# Patient Record
Sex: MALE | Race: ASIAN | NOT HISPANIC OR LATINO | URBAN - METROPOLITAN AREA
[De-identification: names, ages, dates, MRNs, and addresses within clinical notes are randomized per-mention and may not be internally consistent; named-entity substitution may affect disease eponyms.]

---

## 2023-04-08 ENCOUNTER — EMERGENCY (EMERGENCY)
Facility: HOSPITAL | Age: 33
LOS: 1 days | Discharge: HOME | End: 2023-04-08
Admitting: STUDENT IN AN ORGANIZED HEALTH CARE EDUCATION/TRAINING PROGRAM
Payer: COMMERCIAL

## 2023-04-08 VITALS
HEART RATE: 80 BPM | WEIGHT: 175.05 LBS | HEIGHT: 69 IN | DIASTOLIC BLOOD PRESSURE: 85 MMHG | RESPIRATION RATE: 16 BRPM | TEMPERATURE: 98 F | OXYGEN SATURATION: 94 % | SYSTOLIC BLOOD PRESSURE: 131 MMHG

## 2023-04-08 VITALS
HEART RATE: 87 BPM | SYSTOLIC BLOOD PRESSURE: 154 MMHG | DIASTOLIC BLOOD PRESSURE: 92 MMHG | TEMPERATURE: 98 F | OXYGEN SATURATION: 95 % | RESPIRATION RATE: 16 BRPM

## 2023-04-08 PROCEDURE — 87491 CHLMYD TRACH DNA AMP PROBE: CPT

## 2023-04-08 PROCEDURE — 99284 EMERGENCY DEPT VISIT MOD MDM: CPT

## 2023-04-08 PROCEDURE — 87591 N.GONORRHOEAE DNA AMP PROB: CPT

## 2023-04-08 PROCEDURE — 99282 EMERGENCY DEPT VISIT SF MDM: CPT

## 2023-04-08 NOTE — ED PROVIDER NOTE - PATIENT PORTAL LINK FT
You can access the FollowMyHealth Patient Portal offered by [facility] by registering at the following website: [email or web address]. By joining [facility]’s FollowMyHealth portal, you will also be able to view your health information using other applications (apps) compatible with our system.

## 2023-04-08 NOTE — ED ADULT NURSE NOTE - VOIDING
"Chief Complaint   Patient presents with     Hypertension       Initial /82  Pulse 64  Temp 97.8  F (36.6  C) (Oral)  Wt 160 lb 3.2 oz (72.7 kg)  SpO2 100%  BMI 25.09 kg/m2 Estimated body mass index is 25.09 kg/(m^2) as calculated from the following:    Height as of 7/13/17: 5' 7\" (1.702 m).    Weight as of this encounter: 160 lb 3.2 oz (72.7 kg).  Medication Reconciliation: complete   Neva MADDEN M.A.      "
without difficulty

## 2023-04-08 NOTE — ED PROVIDER NOTE - GENITOURINARY, MLM
No penile discharge,  small 4 x 5 mm bloody blister on the lateral aspect of the penile shaft, no active bleeding, no testicular mass, no tenderness,

## 2023-04-08 NOTE — ED PROVIDER NOTE - NSFOLLOWUPCLINICS_GEN_ALL_ED_FT
[facility]  Urology  [address], [address]  [place], [place] [address]  Phone: [phone]  Fax:

## 2023-04-08 NOTE — ED ADULT NURSE NOTE - OBJECTIVE STATEMENT
Pt presented to the ED with complaints of penile pain. As per pt, during sexual intercourse he started having penile pain and noticed there was a blister on his penis. Pt denies dysuria, hematuria, fevers, or abdominal pain.

## 2023-04-08 NOTE — ED PROVIDER NOTE - CLINICAL SUMMARY MEDICAL DECISION MAKING FREE TEXT BOX
33 yo male in the ER with small blood filled blister on his penis noted after intercourse tonight. No penile discharge or lesions, no ulcers, no rash, no testicular pain or mass. most likely small hematoma from friction. unlikely infection.

## 2023-04-08 NOTE — ED PROVIDER NOTE - CARE PROVIDER_API CALL
Renny Watt)  Urology  [address], [address]  [place], [place] [address]  Phone: [phone]  Fax: [phone]  Follow Up Time:

## 2023-04-08 NOTE — ED PROVIDER NOTE - OBJECTIVE STATEMENT
33 yo male in the Er c/o bloody blister on his penis that he noted after an intercourse. Pt mentioned that during the intercourse he had some pain. Blister is not bleeding, pt denies any blood in his urine, denies any penile discharge, denies testicular pain or edema.

## 2023-04-10 DIAGNOSIS — S30.822A BLISTER (NONTHERMAL) OF PENIS, INITIAL ENCOUNTER: ICD-10-CM
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
## 2023-04-10 LAB
C TRACH RRNA SPEC QL NAA+PROBE: SIGNIFICANT CHANGE UP
N GONORRHOEA RRNA SPEC QL NAA+PROBE: SIGNIFICANT CHANGE UP
SPECIMEN SOURCE: SIGNIFICANT CHANGE UP